# Patient Record
Sex: FEMALE | Race: WHITE
[De-identification: names, ages, dates, MRNs, and addresses within clinical notes are randomized per-mention and may not be internally consistent; named-entity substitution may affect disease eponyms.]

---

## 2017-10-03 ENCOUNTER — HOSPITAL ENCOUNTER (OUTPATIENT)
Dept: HOSPITAL 45 - C.LABSPEC | Age: 32
Discharge: HOME | End: 2017-10-03
Attending: OBSTETRICS & GYNECOLOGY
Payer: COMMERCIAL

## 2017-10-03 DIAGNOSIS — Z34.01: Primary | ICD-10-CM

## 2017-10-03 LAB
APPEARANCE UR: CLEAR
BILIRUB UR-MCNC: (no result) MG/DL
COLOR UR: YELLOW
MANUAL MICROSCOPIC REQUIRED?: NO
NITRITE UR QL STRIP: (no result)
PH UR STRIP: 7 [PH] (ref 4.5–7.5)
REVIEW REQ?: NO
SP GR UR STRIP: 1.01 (ref 1–1.03)
URINE BILL WITH OR WITHOUT MIC: (no result)
UROBILINOGEN UR-MCNC: (no result) MG/DL

## 2017-12-05 ENCOUNTER — HOSPITAL ENCOUNTER (OUTPATIENT)
Dept: HOSPITAL 45 - C.LD | Age: 32
Discharge: HOME | End: 2017-12-05
Attending: OBSTETRICS & GYNECOLOGY
Payer: COMMERCIAL

## 2017-12-05 VITALS
HEIGHT: 67.99 IN | BODY MASS INDEX: 31.74 KG/M2 | HEIGHT: 67.99 IN | WEIGHT: 209.44 LBS | WEIGHT: 209.44 LBS | BODY MASS INDEX: 31.74 KG/M2

## 2017-12-05 DIAGNOSIS — O99.89: Primary | ICD-10-CM
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
## 2018-01-15 ENCOUNTER — HOSPITAL ENCOUNTER (OUTPATIENT)
Dept: HOSPITAL 45 - C.LABSPEC | Age: 33
Discharge: HOME | End: 2018-01-15
Attending: OBSTETRICS & GYNECOLOGY
Payer: COMMERCIAL

## 2018-01-15 DIAGNOSIS — Z34.02: Primary | ICD-10-CM

## 2018-03-13 ENCOUNTER — HOSPITAL ENCOUNTER (OUTPATIENT)
Dept: HOSPITAL 45 - C.LABSPEC | Age: 33
Discharge: HOME | End: 2018-03-13
Attending: OBSTETRICS & GYNECOLOGY
Payer: COMMERCIAL

## 2018-03-13 DIAGNOSIS — O09.513: Primary | ICD-10-CM

## 2018-04-14 ENCOUNTER — HOSPITAL ENCOUNTER (INPATIENT)
Dept: HOSPITAL 45 - C.LD | Age: 33
LOS: 4 days | Discharge: HOME | End: 2018-04-18
Attending: OBSTETRICS & GYNECOLOGY | Admitting: OBSTETRICS & GYNECOLOGY
Payer: COMMERCIAL

## 2018-04-14 VITALS
BODY MASS INDEX: 36.98 KG/M2 | HEIGHT: 67.99 IN | WEIGHT: 244.01 LBS | WEIGHT: 244.01 LBS | BODY MASS INDEX: 36.98 KG/M2 | HEIGHT: 67.99 IN

## 2018-04-14 DIAGNOSIS — O32.4XX0: Primary | ICD-10-CM

## 2018-04-14 DIAGNOSIS — Z79.899: ICD-10-CM

## 2018-04-14 DIAGNOSIS — E03.9: ICD-10-CM

## 2018-04-14 DIAGNOSIS — O09.513: ICD-10-CM

## 2018-04-14 DIAGNOSIS — Z3A.40: ICD-10-CM

## 2018-04-14 LAB
EOSINOPHIL NFR BLD AUTO: 201 K/UL (ref 130–400)
HCT VFR BLD CALC: 36.4 % (ref 37–47)
HGB BLD-MCNC: 12.8 G/DL (ref 12–16)
MCH RBC QN AUTO: 30.7 PG (ref 25–34)
MCHC RBC AUTO-ENTMCNC: 35.2 G/DL (ref 32–36)
MCV RBC AUTO: 87.3 FL (ref 80–100)
PMV BLD AUTO: 10.3 FL (ref 7.4–10.4)
RED CELL DISTRIBUTION WIDTH CV: 13.3 % (ref 11.5–14.5)
RED CELL DISTRIBUTION WIDTH SD: 42.8 FL (ref 36.4–46.3)
WBC # BLD AUTO: 15.39 K/UL (ref 4.8–10.8)

## 2018-04-14 RX ADMIN — PENICILLIN G POTASSIUM PRN MLS/HR: 20000000 POWDER, FOR SOLUTION INTRAVENOUS at 23:29

## 2018-04-14 RX ADMIN — SODIUM CHLORIDE, SODIUM LACTATE, POTASSIUM CHLORIDE, AND CALCIUM CHLORIDE SCH MLS/HR: 600; 310; 30; 20 INJECTION, SOLUTION INTRAVENOUS at 20:00

## 2018-04-15 VITALS — OXYGEN SATURATION: 99 %

## 2018-04-15 VITALS
SYSTOLIC BLOOD PRESSURE: 122 MMHG | HEART RATE: 103 BPM | DIASTOLIC BLOOD PRESSURE: 78 MMHG | TEMPERATURE: 98.06 F | OXYGEN SATURATION: 99 %

## 2018-04-15 VITALS — OXYGEN SATURATION: 100 %

## 2018-04-15 VITALS
HEART RATE: 105 BPM | DIASTOLIC BLOOD PRESSURE: 77 MMHG | SYSTOLIC BLOOD PRESSURE: 119 MMHG | OXYGEN SATURATION: 98 % | TEMPERATURE: 98.06 F

## 2018-04-15 VITALS
TEMPERATURE: 98.06 F | DIASTOLIC BLOOD PRESSURE: 75 MMHG | SYSTOLIC BLOOD PRESSURE: 121 MMHG | OXYGEN SATURATION: 99 % | HEART RATE: 85 BPM

## 2018-04-15 VITALS — OXYGEN SATURATION: 98 %

## 2018-04-15 RX ADMIN — SIMETHICONE SCH MG: 80 TABLET, CHEWABLE ORAL at 20:11

## 2018-04-15 RX ADMIN — SIMETHICONE SCH MG: 80 TABLET, CHEWABLE ORAL at 17:03

## 2018-04-15 RX ADMIN — ROPIVACAINE HYDROCHLORIDE PRN ML: 2 INJECTION, SOLUTION EPIDURAL; INFILTRATION at 06:23

## 2018-04-15 RX ADMIN — SIMETHICONE SCH MG: 80 TABLET, CHEWABLE ORAL at 12:11

## 2018-04-15 RX ADMIN — PENICILLIN G POTASSIUM PRN MLS/HR: 20000000 POWDER, FOR SOLUTION INTRAVENOUS at 03:30

## 2018-04-15 RX ADMIN — SODIUM CHLORIDE, SODIUM LACTATE, POTASSIUM CHLORIDE, AND CALCIUM CHLORIDE SCH MLS/HR: 600; 310; 30; 20 INJECTION, SOLUTION INTRAVENOUS at 06:01

## 2018-04-15 RX ADMIN — ROPIVACAINE HYDROCHLORIDE PRN ML: 2 INJECTION, SOLUTION EPIDURAL; INFILTRATION at 07:00

## 2018-04-15 RX ADMIN — DOCUSATE SODIUM SCH MG: 100 CAPSULE, LIQUID FILLED ORAL at 20:11

## 2018-04-15 NOTE — ANESTHESIOLOGY PROGRESS NOTE
Anesthesia Post Op Note


Date & Time


Apr 15, 2018 at 14:05





Vital Signs





Vital Signs Past 12 Hours








  Date Time  Temp Pulse Resp B/P (MAP) Pulse Ox O2 Delivery O2 Flow Rate FiO2


 


4/15/18 12:00   16  100   











Notes


Mental Status:  alert / awake / arousable, participated in evaluation


Pt Amnestic to Procedure:  Yes


Nausea / Vomiting:  adequately controlled


Pain:  adequately controlled


Airway Patency, RR, SpO2:  stable & adequate


BP & HR:  stable & adequate


Hydration State:  stable & adequate


Anesthetic Complications:  no major complications apparent

## 2018-04-15 NOTE — OPERATIVE REPORT
DATE OF OPERATION:  04/15/2018

 

PREOPERATIVE DIAGNOSES:

1.  A 33-year-old G1, P0 at 40 weeks 4 days.

2.  Spontaneous labor.

3.  Group B strep positive.

4.  Hypothyroidism.

 

POSTDELIVERY DIAGNOSIS:  Same.

 

PROCEDURES PERFORMED:  Primary low transverse  section with birth of

live male child.

 

SURGEON:  Kaitlyn Brunner, DO

 

ASSISTANT:  Dr. Raymond.

 

ESTIMATED BLOOD LOSS:  500 mL.

 

FINDINGS:  Viable male  with Apgars of 6, 9 and 9, weight pending. 

Please see nursery records.  Normal appearing uterus, tubes, and ovaries.

 

SPECIMENS:  Placenta, cord blood, cord gases.

 

DRAINS:  Ramírez, clear yellow.

 

ANESTHESIA:  Redosing of epidural.

 

COMPLICATIONS:  The baby's head was wedged in pelvis after pushing for 3

hours.  This required upward pressure and suction release with the second

's hand from below, vaginally.

 

DISPOSITION:  Stable and good to recovery room.

 

INDICATIONS FOR PROCEDURE:  The patient presented to labor and delivery in

spontaneous labor and progressed to complete.  She then pushed for

approximately 1 hour and 15 minutes and then was allowed to rest and labor

down further.  She did have an epidural.  She then began to push again and

after 3 hours of pushing upon reevaluation the fetal station did not progress

beyond 2+.  The fetal head was thought to be in occiput posterior position. 

Attempts were made to rotate the fetal head during the pushing.  This was

unsuccessful.  I discussed with the patient the indications for 

section for failure to descend and she is agreeable for  section.  We

reviewed risks, benefits, and alternatives and informed consent was obtained.

 

DESCRIPTION OF DELIVERY:  The patient was taken to the operating room.  Ancef

was given and her epidural anesthesia was redosed.  Timeout was confirmed. 

She was prepared and draped in the usual sterile fashion in the supine

position with a leftward tilt.  A timeout was confirmed.  A Pfannenstiel skin

incision was made with a scalpel and carried through to the underlying layer

of fascia.  The fascia was nicked at midline and this incision was extended

bilaterally.  The superior aspect of fascial incision was grasped with Kocher

clamps x2 and elevated off the underlying rectus abdominis muscles.  In a

similar fashion, the inferior aspect of this incision was dissected.  The

rectus abdominis muscles were  at midline and the peritoneum was

entered bluntly digitally.  This incision was extended bluntly.  The bladder

blade was placed.  A bladder flap was created using Metzenbaum scissors.  The

bladder blade was replaced.  The uterine incision was made with a scalpel and

blunt dissection.  This was extended cephalad, caudad manually.  The first

attempt was made to deliver the baby from a cephalic occiput posterior

presentation.  However, I could not get the suction to release and so we

attempted to use Dr. Raymond's position on the opposite side of the table to

get her hand below the baby's head to get the pelvic suction to release; this

was unsuccessful even with upward pressure from below provided by circulating

nurse.  Next, Dr. Raymond stepped away from the table and went down below and

inserted her hand through into the vaginal canal and upon spreading her

fingers, was able to get the pelvic suction to release.  The head then

delivered easily from a cephalic occiput posterior presentation.  The head

delivered, followed by anterior and posterior shoulders.  Nuchal cord x1 was

easily reduced.  The cord was doubly clamped and cut and the baby was

immediately handed to the waiting pediatrician.  A cord segment was obtained

for cord gases.  Cord blood was obtained.  Placenta delivered spontaneously

intact with a 3-vessel cord.  There was meconium noted staining of the

membranes.  The uterus was exteriorized.  The uterus was cleared of all clots

and debris and the uterus was already becoming firm.  Pitocin was

administered by anesthesia.  The hysterotomy incision was reapproximated

using 0 Vicryl in a running locked stitch, a second layer of the same suture

was used to imbricate the hysterotomy incision.  The pelvis was inspected.  A

small rent of adhesive tissue was noted on the posterior left uterus;

otherwise uterus, fallopian tubes, and ovaries appeared normal.  The uterus

was replaced inside the abdomen.  The gutters were cleared of all clots and

debris.  Excellent hemostasis was noted.  Prior to excellent hemostasis was

noted, please note, there was some oozing from suture sites.  Therefore,

Tony powder was used for better hemostasis.  Upon application of the

Tony, excellent hemostasis was noted.  Next, the fascial incision was

reapproximated using 0 Vicryl in a running stitch.  The subcutaneous tissue

was reapproximated using 2-0 plain gut in a running stitch and the skin was

reapproximated using 4-0 Vicryl in a running subcuticular stitch. 

Steri-Strips were applied as well as a bandage.  The patient was taken from

the operating room to her labor and delivery room in stable and good

condition.  Sponge, instrument, and needle counts were correct at the

conclusion of the case x2.

 

 

I attest to the content of the Intraoperative Record and any orders documented therein. Any exception
s are noted below.

## 2018-04-15 NOTE — [facility] POST OPERATIVE BRIEF NOTE
Immediate Operative Summary


Operative Date


Apr 15, 2018.





Pre-Operative Diagnosis





1. Term Pregnancy at 40 weeks 5 days.





2. Failure to descend.





3. GBS positive.





Post-Operative Diagnosis





same





Procedure(s) Performed





Primary lower transerve cesearean section with the birth of a live male child 


0816.





Surgeon


Dr. K. Brunner





Assistant Surgeon(s)


Dr. QUENTIN Raymond





Estimated Blood Loss


500ml





Findings


See Below


Viable male , APGARS 6/9/9, weight pending. Normal pelvis.





Specimens





A: Placenta hol





B: Cord Blood





C: Cord gases





Drains


mishra, clear yellow





Anesthesia Type


L&D Only EPID Exist





Complication(s)





Head wedged in pelvis, required upward pressure and suction release with


2nd 's hand from below.





Disposition


Accompanied Pt To Recover:  no


Disposition:  Recovery Room / PACU

## 2018-04-16 VITALS
DIASTOLIC BLOOD PRESSURE: 77 MMHG | OXYGEN SATURATION: 97 % | SYSTOLIC BLOOD PRESSURE: 112 MMHG | TEMPERATURE: 98.06 F | HEART RATE: 103 BPM

## 2018-04-16 VITALS — HEART RATE: 96 BPM | TEMPERATURE: 97.7 F | DIASTOLIC BLOOD PRESSURE: 72 MMHG | SYSTOLIC BLOOD PRESSURE: 110 MMHG

## 2018-04-16 VITALS
OXYGEN SATURATION: 98 % | SYSTOLIC BLOOD PRESSURE: 108 MMHG | DIASTOLIC BLOOD PRESSURE: 70 MMHG | HEART RATE: 94 BPM | TEMPERATURE: 98.96 F

## 2018-04-16 VITALS — OXYGEN SATURATION: 97 %

## 2018-04-16 LAB
BASOPHILS # BLD: 0.01 K/UL (ref 0–0.2)
BASOPHILS NFR BLD: 0.1 %
EOS ABS #: 0.07 K/UL (ref 0–0.5)
EOSINOPHIL NFR BLD AUTO: 161 K/UL (ref 130–400)
HCT VFR BLD CALC: 33.8 % (ref 37–47)
HGB BLD-MCNC: 11.9 G/DL (ref 12–16)
IG#: 0.03 K/UL (ref 0–0.02)
IMM GRANULOCYTES NFR BLD AUTO: 12.4 %
LYMPHOCYTES # BLD: 1.59 K/UL (ref 1.2–3.4)
MCH RBC QN AUTO: 31.2 PG (ref 25–34)
MCHC RBC AUTO-ENTMCNC: 35.2 G/DL (ref 32–36)
MCV RBC AUTO: 88.5 FL (ref 80–100)
MONO ABS #: 0.42 K/UL (ref 0.11–0.59)
MONOCYTES NFR BLD: 3.3 %
NEUT ABS #: 10.71 K/UL (ref 1.4–6.5)
NEUTROPHILS # BLD AUTO: 0.5 %
NEUTROPHILS NFR BLD AUTO: 83.5 %
PMV BLD AUTO: 10.1 FL (ref 7.4–10.4)
RED CELL DISTRIBUTION WIDTH CV: 13.9 % (ref 11.5–14.5)
RED CELL DISTRIBUTION WIDTH SD: 45.2 FL (ref 36.4–46.3)
WBC # BLD AUTO: 12.83 K/UL (ref 4.8–10.8)

## 2018-04-16 RX ADMIN — DOCUSATE SODIUM SCH MG: 100 CAPSULE, LIQUID FILLED ORAL at 08:28

## 2018-04-16 RX ADMIN — SIMETHICONE SCH MG: 80 TABLET, CHEWABLE ORAL at 17:22

## 2018-04-16 RX ADMIN — Medication PRN MG: at 08:32

## 2018-04-16 RX ADMIN — SIMETHICONE SCH MG: 80 TABLET, CHEWABLE ORAL at 08:28

## 2018-04-16 RX ADMIN — Medication PRN MG: at 13:30

## 2018-04-16 RX ADMIN — LEVOTHYROXINE SODIUM SCH MCG: 150 TABLET ORAL at 08:29

## 2018-04-16 RX ADMIN — SIMETHICONE SCH MG: 80 TABLET, CHEWABLE ORAL at 19:56

## 2018-04-16 RX ADMIN — SIMETHICONE SCH MG: 80 TABLET, CHEWABLE ORAL at 14:38

## 2018-04-16 RX ADMIN — DOCUSATE SODIUM SCH MG: 100 CAPSULE, LIQUID FILLED ORAL at 19:56

## 2018-04-16 RX ADMIN — Medication PRN MG: at 17:22

## 2018-04-16 RX ADMIN — Medication PRN MG: at 22:07

## 2018-04-16 NOTE — PROGRESS NOTE
Subjective


2018.


Subjective


conversation w/ patient, conversation w/ family, physical exam, chart review, 

lab review


Ambulation:  ambulating normally


Voiding:  no voiding problems


Passing Gas:  Yes


Diet Tolerance:  Clear Liquids


Lochia:  Small


Feeding Type:  Breast Feeding





Review of Systems


Constitutional:  No fever, No chills


Respiratory:  No cough, No sputum, No shortness of breath


Cardiac:  No chest pain, No palpitations


Abdomen:  No pain, No nausea, No vomiting


Female :  No dysuria





Objective


Vital Signs











  Date Time  Temp Pulse Resp B/P (MAP) Pulse Ox O2 Delivery O2 Flow Rate FiO2


 


18 04:05 36.5 96 18 110/72 (85)    


 


18 01:00   20  97   


 


18 00:30   18  97   


 


4/15/18 23:30   16  98   


 


4/15/18 23:20     98 Room Air  


 


4/15/18 23:20 36.7 105 16 119/77 (91) 98 Room Air  


 


4/15/18 22:30   18  99   


 


4/15/18 21:30   18  99   


 


4/15/18 20:45     99 Room Air  


 


4/15/18 20:45 36.7 103 16 122/78 (93) 100 Room Air  


 


4/15/18 20:45   16  100   


 


4/15/18 19:30   18  99   


 


4/15/18 18:35   16  100   


 


4/15/18 17:30   18  99   


 


4/15/18 16:30 36.7 85 18 121/75 (90) 99 Room Air  


 


4/15/18 16:30     99 Room Air  


 


4/15/18 16:30   16  99   


 


4/15/18 15:00   16  99   


 


4/15/18 14:00   16  100   


 


4/15/18 13:00   17  100   


 


4/15/18 12:00   16  100   











Physical Exam


General Appearance:  WELL-APPEARING, WD/WN, NO APPARENT DISTRESS


Respiratory/Chest:  lungs clear, no respiratory distress


Cardiovascular:  regular rate, rhythm, no murmur


Abdomen:  non tender, soft


Fundus:  Firm


Incision Description:  Clean, Dry & Intact


Extremities:  non-tender, normal inspection





Laboratory Results





Last 24 Hours








Test


  18


06:04


 


White Blood Count 12.83 K/uL 


 


Red Blood Count 3.82 M/uL 


 


Hemoglobin 11.9 g/dL 


 


Hematocrit 33.8 % 


 


Mean Corpuscular Volume 88.5 fL 


 


Mean Corpuscular Hemoglobin 31.2 pg 


 


Mean Corpuscular Hemoglobin


Concent 35.2 g/dl 


 


 


Platelet Count 161 K/uL 


 


Mean Platelet Volume 10.1 fL 


 


Neutrophils (%) (Auto) 83.5 % 


 


Lymphocytes (%) (Auto) 12.4 % 


 


Monocytes (%) (Auto) 3.3 % 


 


Eosinophils (%) (Auto) 0.5 % 


 


Basophils (%) (Auto) 0.1 % 


 


Neutrophils # (Auto) 10.71 K/uL 


 


Lymphocytes # (Auto) 1.59 K/uL 


 


Monocytes # (Auto) 0.42 K/uL 


 


Eosinophils # (Auto) 0.07 K/uL 


 


Basophils # (Auto) 0.01 K/uL 


 


RDW Standard Deviation 45.2 fL 


 


RDW Coefficient of Variation 13.9 % 


 


Immature Granulocyte % (Auto) 0.2 % 


 


Immature Granulocyte # (Auto) 0.03 K/uL 











Assessment and Plan


Post-Op


Day#:  1


Continue Routine Care:


32 yo F, post op day 1. Ramírez was removed, adequate output, urinating on her 

own. Patient is ambulating well. Vitals reviewed; WNL. Hgb 12.8 on admission---

pending this am. No signs or sx of anemia. Doing well clinically.





1. Recovery from --monitor lochia, control pain, ambulate, support BF, 

follow i/o's 





Resident Physician Supervision Note:





I interviewed and examined the patient. Discussed with Dr. Cash and agree 

with findings and plan as documented in the note. Any exceptions or 

clarifications are listed here: POD#1 s/p primary  section for failure 

to descend. Doing well.  Continue routine postop care. 





Documented By:  Kaitlyn B Brunner

## 2018-04-17 VITALS
OXYGEN SATURATION: 99 % | SYSTOLIC BLOOD PRESSURE: 116 MMHG | HEART RATE: 80 BPM | TEMPERATURE: 98.06 F | DIASTOLIC BLOOD PRESSURE: 76 MMHG

## 2018-04-17 VITALS
HEART RATE: 85 BPM | DIASTOLIC BLOOD PRESSURE: 79 MMHG | SYSTOLIC BLOOD PRESSURE: 137 MMHG | OXYGEN SATURATION: 99 % | TEMPERATURE: 97.7 F

## 2018-04-17 VITALS — OXYGEN SATURATION: 99 %

## 2018-04-17 LAB
HCT VFR BLD CALC: 30.4 % (ref 37–47)
HGB BLD-MCNC: 10.6 G/DL (ref 12–16)

## 2018-04-17 RX ADMIN — LEVOTHYROXINE SODIUM SCH MCG: 150 TABLET ORAL at 07:21

## 2018-04-17 RX ADMIN — Medication PRN MG: at 03:46

## 2018-04-17 RX ADMIN — SIMETHICONE SCH MG: 80 TABLET, CHEWABLE ORAL at 19:42

## 2018-04-17 RX ADMIN — Medication PRN MG: at 21:17

## 2018-04-17 RX ADMIN — Medication PRN MG: at 13:34

## 2018-04-17 RX ADMIN — DOCUSATE SODIUM SCH MG: 100 CAPSULE, LIQUID FILLED ORAL at 19:41

## 2018-04-17 RX ADMIN — SIMETHICONE SCH MG: 80 TABLET, CHEWABLE ORAL at 17:24

## 2018-04-17 RX ADMIN — SIMETHICONE SCH MG: 80 TABLET, CHEWABLE ORAL at 11:34

## 2018-04-17 RX ADMIN — DOCUSATE SODIUM SCH MG: 100 CAPSULE, LIQUID FILLED ORAL at 08:27

## 2018-04-17 RX ADMIN — Medication PRN MG: at 17:25

## 2018-04-17 RX ADMIN — Medication PRN MG: at 09:25

## 2018-04-17 RX ADMIN — SIMETHICONE SCH MG: 80 TABLET, CHEWABLE ORAL at 08:27

## 2018-04-17 NOTE — DISCHARGE INSTRUCTIONS
Discharge Instructions


Date of Service


2018.





Admission


Reason for Admission:  Check Labor





Discharge


Discharge Diagnosis / Problem:  c section delivery





Discharge Goals


Goal(s):  Routine recovery after 





Medications


Continue Dispensed Medications:  supercream, dermaplast, tucks, lansinoh





Activity Recommendations


Activity Limitations:  per Instructions/Follow-up section





.





Instructions / Follow-Up


Instructions / Follow-Up





ACTIVITY RECOMMENDATIONS:





* Gradual return to full activity over the next 2-3 weeks.


* No lifting - nothing heavier than baby over the next 2-3 weeks.


* Do not engage in vigorous exercise, sexual activity or sports until cleared by


   your physician.


* Do not drive or operate any motorized equipment until cleared by your 

physician.


* You may shower/bathe daily.








MEDICATIONS:





For discomfort or pain, you may use Acetaminophen (Tylenol), Ibuprofen (Advil),


or Naproxen (Aleve) following the package directions. For constipation you may 


use Colace following the package directions.








BREAST CARE:





If you are not breast feeding:





*  Wear a supportive bra 24 hours a day for one to two weeks.


*  Avoid stimulating your breasts and nipples as much as possible during the 

first 


    few weeks after delivery.


*  When taking a shower, have the warm water hit your back, not breasts.


*  When your breasts feel full, apply ice packs.  Usually three to four times a 

day


    helps ease the discomfort.


*  Take a mild pain medication (Tylenol / Motrin) when you are uncomfortable.





If breast feeding:





*  Use breast milk to lubricate nipples.  Lansinoh cream may be used for sore 

nipples. 


    You do not need to remove cream prior to breast feeding.  If using a 

different brand


   of cream, check the label for directions regarding removal of cream prior to 

nursing.


*  Wear a supportive bra.


*  If having problems with breasts or breast feeding, call a lactation 

consultant 


    or your health care provider.








SPECIAL CARE INSTRUCTIONS:





When you are discharged from the hospital, it is important for you to follow 


the instructions listed below:





*  During the first week at home, you should be able to care for yourself and


    your baby.  In addition, the usual light household activities are 

encouraged.





*  Limit your activities to the way you feel.  Do not try to clean the house or 


    move furniture.  Be sensible.





*  If you actively engage in sports and have done so up until the time of your 


    delivery, you may resume these activities as soon as you feel able.  This 

may


    take up to one month or even longer.  Use good judgment.





*  Continue to take your prenatal vitamins for at least six weeks after the 

birth


    of your baby.





*  Your diet need not be limited unless you were on a special diet before your


    delivery. Breast-feeding mothers need around 2500 calories per day and at 

least 


    64-80 ounces of fluid per day (8 to 10 glasses).





*  You should eat foods from the four major food groups.  Crash diets or fad 


    diets are to be avoided.  Eating lean meats, fresh fruits and vegetables, 

low-fat


    dairy products, high fiber foods and a regular exercise program, will help 

you get


    back to your pre-pregnancy weight without putting your health at risk.





*  Constipation is sometimes a problem after delivery.  Take a mild laxative as 


    needed.  If breast feeding, Milk of Magnesia is acceptable to use. You may 


    use a suppository or Fleets enema.





*  A daily shower or tub bath is suggested.  Wash incision daily with warm 

soapy 


    water and pat dry.  It doesn't need to be covered unless drainage is 

present.





*  A bloody vaginal discharge will usually continue until around four weeks 

postpartum.


    A small amount of bleeding may continue for as long as six weeks.  Vaginal 

discharge 


    changes from the bright red bleeding after delivery to pink then brownish 

and finally 


    yellowish-pink before becoming white and disappearing.





*  Bleeding may increase with activity.  Your first period may come in 4-8 

weeks.  


    If you are breast feeding, your period may be delayed even longer.





*  Ruhenstroth (sex) can begin whenever both you and your partner feel 

comfortable


    and do not have any form of genital infection.  It is recommended that you 

wait at


    least six weeks for internal and external healing to occur.  If you have 

questions, 


    please talk to your health care practitioner.  A condom should be used to 

prevent


    infection and pregnancy.





*  Foreplay, gentle intercourse and lubrication is very important the first 

several times


   to prevent pain.  A water-based lubricant such as K-Y jelly or Astroglide 

may be used.





*  If you have RH negative blood and your baby is RH positive, you will receive 

RHOGAM 


    by injection prior to discharge.  The nurse will give you a card to keep 

with you that 


    has the date and place that you received RHOGAM after delivery.





*  During your prenatal care, you had a Rubella screen done to check for the 

presence 


    of rubella antibodies in your blood.  If your test was negative, you will 

receive a 


    Rubella vaccine prior to discharge.  This vaccine may cause a fever, 

soreness at the


    injection site and flu-like symptoms.  If these symptoms persist, notify 

your health 


    care practitioner.  Pregnancy is not advised for one month after a Rubella 

vaccine.





*  Verbalizes understanding of car seat law as reviewed with patient nursing.





*  Car Seat hand-out given and reviewed with patient by nursing.





*  Shaken baby information reviewed with patient by nursing.


 


Call you doctor if:





*  Heavy bleeding (saturating several pads an hour) or passing clots the size 

of your fist.





*  A fever >101 degrees F (38.3 degrees C) on two occasions four hours apart and

/or chills.





*  Unusual pain in the pelvic or vaginal areas.





*  Call the doctor for any increased redness, drainage or swelling around the 

incision and


    any pain unrelieved by prescribed pain medication.





* "Baby Blues" lasting longer than two weeks.





If you have any questions or concerns, call your health care practitioner at 


[phone].








FOLLOW UP VISIT:





*  Please call the office at [phone] to schedule a 6 week postpartum 

examination.  


    It is important you keep this appointment.  It is important for you to make 

arrangements 


    for either yearly or twice yearly check-ups thereafter.





Current Hospital Diet


Patient's current hospital diet: Regular OB Diet





Discharge Diet


Recommended Diet:  Regular Diet, Regular OB Diet





Procedures


Procedures Performed:  


Primary lower transerve cesearean section with the birth of a live male child 


0816.





Pending Studies


Studies pending at discharge:  no





Medical Emergencies








.


Who to Call and When:





Medical Emergencies:  If at any time you feel your situation is an emergency, 

please call [phone] immediately.





.





Non-Emergent Contact


Non-Emergency issues call your:  Primary Care Provider, Gynecologist





.


.








"Provider Documentation" section prepared by Osmany Cash.








.

## 2018-04-18 VITALS — SYSTOLIC BLOOD PRESSURE: 101 MMHG | DIASTOLIC BLOOD PRESSURE: 61 MMHG | TEMPERATURE: 97.88 F | HEART RATE: 70 BPM

## 2018-04-18 VITALS
DIASTOLIC BLOOD PRESSURE: 72 MMHG | OXYGEN SATURATION: 100 % | SYSTOLIC BLOOD PRESSURE: 119 MMHG | HEART RATE: 72 BPM | TEMPERATURE: 98.42 F

## 2018-04-18 VITALS — TEMPERATURE: 98.42 F | HEART RATE: 72 BPM | DIASTOLIC BLOOD PRESSURE: 72 MMHG

## 2018-04-18 RX ADMIN — Medication PRN MG: at 06:13

## 2018-04-18 RX ADMIN — Medication PRN MG: at 11:31

## 2018-04-18 RX ADMIN — LEVOTHYROXINE SODIUM SCH MCG: 150 TABLET ORAL at 07:09

## 2018-04-18 RX ADMIN — DOCUSATE SODIUM SCH MG: 100 CAPSULE, LIQUID FILLED ORAL at 08:01

## 2018-04-18 RX ADMIN — SIMETHICONE SCH MG: 80 TABLET, CHEWABLE ORAL at 08:01

## 2018-04-18 RX ADMIN — SIMETHICONE SCH MG: 80 TABLET, CHEWABLE ORAL at 12:15

## 2018-04-18 NOTE — PROGRESS NOTE
Subjective


2018.


Subjective


conversation w/ patient, physical exam, chart review, lab review


Ambulation:  ambulating normally


Voiding:  no voiding problems


Passing Gas:  Yes


Diet Tolerance:  Clear Liquids


Lochia:  Small


Feeding Type:  Breast Feeding





Review of Systems


Constitutional:  No fever, No chills


Respiratory:  No cough, No shortness of breath


Cardiac:  No chest pain, No palpitations


Abdomen:  No pain, No nausea, No vomiting


Female :  No dysuria





Objective


Vital Signs











  Date Time  Temp Pulse Resp B/P (MAP) Pulse Ox O2 Delivery O2 Flow Rate FiO2


 


18 00:01 36.6 70 18 101/61 (74)  Room Air  


 


18 00:01      Room Air  


 


18 15:15     99 Room Air  


 


18 15:15 36.5 85 18 137/79 (98) 99 Room Air  


 


18 08:06     99 Room Air  


 


18 07:46 36.7 80 20 116/76 (89) 99 Room Air  


 


18 07:45     99 Room Air  











Physical Exam


General Appearance:  WELL-APPEARING, WD/WN, NO APPARENT DISTRESS


Respiratory/Chest:  lungs clear, no respiratory distress


Cardiovascular:  regular rate, rhythm, no murmur


Abdomen:  non tender, soft


Fundus:  Firm, Relation to Umbilicus (1 cm below )


Incision Description:  Clean, Dry & Intact


Extremities:  non-tender, normal inspection





Assessment and Plan


Post-Op


Day#:  3


Continue Routine Care:


34 yo F, post op day 3.  Patient is ambulating well. Vitals reviewed; WNL. Hgb 

12.8 on admission---11.9 --->10.6 on . No signs or sx of anemia. Doing 

well clinically.





1. Recovery from --monitor lochia, control pain, ambulate, support BF


2. Discussed dc planning





Resident Physician Supervision Note:





I interviewed and examined the patient. Discussed with Dr. Cash and agree 

with findings and plan as documented in the note. Any exceptions or 

clarifications are listed here: Plan d/c.  Instructions given.





Documented By:  Ebony Campo

## 2023-10-16 NOTE — PROGRESS NOTE
Subjective


2018.


Subjective


conversation w/ patient, conversation w/ family, physical exam, chart review, 

lab review


Ambulation:  ambulating normally


Voiding:  no voiding problems


Passing Gas:  Yes


Diet Tolerance:  Regular Diet


Lochia:  Moderate


Feeding Type:  Breast Feeding


Pain:  well controlled with motrin/tylenol





Review of Systems


Constitutional:  No fever, No chills


Respiratory:  No cough, No shortness of breath


Cardiac:  No chest pain, No palpitations


Abdomen:  No pain, No nausea, No vomiting


Female :  No dysuria





Objective


Vital Signs











  Date Time  Temp Pulse Resp B/P (MAP) Pulse Ox O2 Delivery O2 Flow Rate FiO2


 


18 15:50 37.2 94 18 108/70 (83) 98 Room Air  


 


18 15:50     98 Room Air  


 


18 07:34 36.7 103 18 112/77 (89) 97 Room Air  


 


18 07:30      Room Air  











Physical Exam


General Appearance:  WELL-APPEARING, WD/WN, NO APPARENT DISTRESS


Respiratory/Chest:  lungs clear, no respiratory distress


Cardiovascular:  regular rate, rhythm, no murmur


Abdomen:  non tender, soft


Fundus:  Firm


Incision Description:  Clean, Dry & Intact


Extremities:  non-tender, normal inspection





Laboratory Results





Last 24 Hours








Test


  18


06:00











Assessment and Plan


Post-Op


Day#:  2


Continue Routine Care:


34 yo F, post op day 2.  Patient is ambulating well. Vitals reviewed; WNL. Hgb 

12.8 on admission---11.9 --->pending this am. No signs or sx of anemia. 

Doing well clinically.





1. Recovery from --monitor lochia, control pain, ambulate, support BF


2. Discussed dc planning  





Resident Physician Supervision Note:





I interviewed and examined the patient. Discussed with Dr. Cash and agree 

with findings and plan as documented in the note. Any exceptions or 

clarifications are listed here: [None]





Documented By:  Rut Raymond Uzair: Pt states frequent urination after bumex